# Patient Record
(demographics unavailable — no encounter records)

---

## 2024-11-01 NOTE — REASON FOR VISIT
[Follow-Up Visit] : a follow-up visit for [Artificial Hip Joint] : an artificial hip joint [FreeTextEntry2] : RIGHT HIP REPLACED 8/21. STATES THE HIP PREVENTS HER FROM SLEEPING THROUGHT THE NIGHT. THE SIGHT OF THE STITCHES FEEL WIERD TO HER.

## 2024-11-01 NOTE — PHYSICAL EXAM
[de-identified] : Right hip is examined the wound is clean and dry there is no undue warmth swelling no drainage.  No effusion or hematoma noted.  She is neurovascular intact distally she continues to exhibit a 6 to 8 mm leg length discrepancy right longer than left. [de-identified] : We had radiographs performed today of the lumbar spine patient has severe arthritis of the lumbar spine with L4-5 and L5 1 nearly complete disc height loss with varying levels of arthritis through the facet joints.  Patient had repeat radiographs of the right hip showing no evidence of any change in position of the components compared to the previous radiographs.  No evidence of any mechanical issues.

## 2024-11-01 NOTE — HISTORY OF PRESENT ILLNESS
[de-identified] : Patient returns today approximate 6-week status post right hip replacement.  Patient is stating that she has difficulty sleeping on that side she has radiating pain from her back to the lateral aspect of the hip.  She has a chronic history of low back issues and sciatica.  Patient is here for first-time evaluation for the specific issue.

## 2024-11-01 NOTE — DISCUSSION/SUMMARY
[de-identified] : Patient I reviewed the radiographs of her spine she was able to appreciate the significant narrowing of the intervertebral spaces in the foramen in the lower lumbar segments.  We talked about her options I believe she would be best served with a trial of a Medrol Dosepak the reasonable risk and benefits of medication were discussed in detail include potential side effects.  We also reviewed the patient current medication profile.  There appears to be no current contraindication to his limited use.  Patient will follow-up after the Medrol Dosepak for repeat evaluation she can alternatively just call me with how she feels at that point we may consider a repeat dose or an MRI of the lumbar spine based on her symptoms that point.